# Patient Record
Sex: FEMALE | Race: ASIAN | Employment: UNEMPLOYED | URBAN - METROPOLITAN AREA
[De-identification: names, ages, dates, MRNs, and addresses within clinical notes are randomized per-mention and may not be internally consistent; named-entity substitution may affect disease eponyms.]

---

## 2019-03-15 PROCEDURE — 88175 CYTOPATH C/V AUTO FLUID REDO: CPT | Performed by: OBSTETRICS & GYNECOLOGY

## 2019-03-15 PROCEDURE — 87624 HPV HI-RISK TYP POOLED RSLT: CPT | Performed by: OBSTETRICS & GYNECOLOGY

## 2022-02-04 ENCOUNTER — OFFICE VISIT (OUTPATIENT)
Dept: SURGERY | Facility: CLINIC | Age: 47
End: 2022-02-04
Payer: COMMERCIAL

## 2022-02-04 VITALS — HEIGHT: 60 IN | BODY MASS INDEX: 23.75 KG/M2 | WEIGHT: 121 LBS | TEMPERATURE: 97 F

## 2022-02-04 DIAGNOSIS — Q83.1 AXILLARY ACCESSORY BREAST TISSUE: Primary | ICD-10-CM

## 2022-02-04 PROCEDURE — 99244 OFF/OP CNSLTJ NEW/EST MOD 40: CPT | Performed by: SURGERY

## 2022-02-04 PROCEDURE — 3008F BODY MASS INDEX DOCD: CPT | Performed by: SURGERY

## 2022-03-01 ENCOUNTER — OFFICE VISIT (OUTPATIENT)
Dept: SURGERY | Facility: CLINIC | Age: 47
End: 2022-03-01
Payer: COMMERCIAL

## 2022-03-01 VITALS
HEART RATE: 85 BPM | SYSTOLIC BLOOD PRESSURE: 117 MMHG | BODY MASS INDEX: 23.22 KG/M2 | HEIGHT: 61 IN | OXYGEN SATURATION: 98 % | DIASTOLIC BLOOD PRESSURE: 74 MMHG | RESPIRATION RATE: 16 BRPM | WEIGHT: 123 LBS

## 2022-03-01 DIAGNOSIS — M79.89 AXILLARY SWELLING: Primary | ICD-10-CM

## 2022-03-01 PROCEDURE — 3074F SYST BP LT 130 MM HG: CPT | Performed by: SURGERY

## 2022-03-01 PROCEDURE — 3078F DIAST BP <80 MM HG: CPT | Performed by: SURGERY

## 2022-03-01 PROCEDURE — 99242 OFF/OP CONSLTJ NEW/EST SF 20: CPT | Performed by: SURGERY

## 2022-03-01 PROCEDURE — 3008F BODY MASS INDEX DOCD: CPT | Performed by: SURGERY

## 2022-04-05 ENCOUNTER — OFFICE VISIT (OUTPATIENT)
Dept: SURGERY | Facility: CLINIC | Age: 47
End: 2022-04-05
Payer: COMMERCIAL

## 2022-04-05 DIAGNOSIS — Q83.1 ACCESSORY BREAST TISSUE OF AXILLA: Primary | ICD-10-CM

## 2022-04-05 PROCEDURE — 99212 OFFICE O/P EST SF 10 MIN: CPT | Performed by: SURGERY

## 2022-04-14 ENCOUNTER — TELEPHONE (OUTPATIENT)
Dept: SURGERY | Facility: CLINIC | Age: 47
End: 2022-04-14

## 2022-04-14 NOTE — TELEPHONE ENCOUNTER
Patient was called and offered surgery Date 7/27/2022 @ THE [facility]. Patient excepted. Pt reminded to complete medical clearance. Pt verbalized understanding.

## 2022-05-24 ENCOUNTER — TELEPHONE (OUTPATIENT)
Dept: SURGERY | Facility: CLINIC | Age: 47
End: 2022-05-24

## 2022-05-24 DIAGNOSIS — Q83.1 ACCESSORY BREAST TISSUE OF AXILLA: Primary | ICD-10-CM

## 2022-05-24 NOTE — TELEPHONE ENCOUNTER
Called patient and let her know we needed to change surgery to 08/10/2022 at [facility] with Dr. Paul Maier

## 2022-07-21 ENCOUNTER — TELEPHONE (OUTPATIENT)
Dept: SURGERY | Facility: CLINIC | Age: 47
End: 2022-07-21

## 2022-08-01 ENCOUNTER — TELEPHONE (OUTPATIENT)
Dept: SURGERY | Facility: CLINIC | Age: 47
End: 2022-08-01

## 2022-08-04 ENCOUNTER — TELEPHONE (OUTPATIENT)
Dept: SURGERY | Facility: CLINIC | Age: 47
End: 2022-08-04

## 2022-08-09 ENCOUNTER — TELEPHONE (OUTPATIENT)
Dept: SURGERY | Facility: CLINIC | Age: 47
End: 2022-08-09

## 2022-08-10 ENCOUNTER — HOSPITAL ENCOUNTER (OUTPATIENT)
Facility: HOSPITAL | Age: 47
Setting detail: HOSPITAL OUTPATIENT SURGERY
Discharge: HOME | End: 2022-08-10
Attending: SURGERY | Admitting: SURGERY
Payer: COMMERCIAL

## 2022-08-10 ENCOUNTER — ANESTHESIA EVENT (OUTPATIENT)
Dept: SURGERY | Facility: HOSPITAL | Age: 47
End: 2022-08-10
Payer: COMMERCIAL

## 2022-08-10 ENCOUNTER — ANESTHESIA (OUTPATIENT)
Dept: SURGERY | Facility: HOSPITAL | Age: 47
End: 2022-08-10
Payer: COMMERCIAL

## 2022-08-10 VITALS
BODY MASS INDEX: 22.66 KG/M2 | SYSTOLIC BLOOD PRESSURE: 114 MMHG | OXYGEN SATURATION: 100 % | RESPIRATION RATE: 14 BRPM | HEART RATE: 83 BPM | DIASTOLIC BLOOD PRESSURE: 64 MMHG | TEMPERATURE: 97 F | HEIGHT: 61 IN | WEIGHT: 120 LBS

## 2022-08-10 DIAGNOSIS — Q83.1 ACCESSORY BREAST TISSUE OF AXILLA: ICD-10-CM

## 2022-08-10 DIAGNOSIS — Z20.822 ENCOUNTER FOR PREPROCEDURE SCREENING LABORATORY TESTING FOR COVID-19: Primary | ICD-10-CM

## 2022-08-10 DIAGNOSIS — Z01.812 ENCOUNTER FOR PREPROCEDURE SCREENING LABORATORY TESTING FOR COVID-19: Primary | ICD-10-CM

## 2022-08-10 DIAGNOSIS — M79.89 AXILLARY SWELLING: ICD-10-CM

## 2022-08-10 LAB
B-HCG UR QL: NEGATIVE
B-HCG UR QL: NEGATIVE

## 2022-08-10 PROCEDURE — 88305 TISSUE EXAM BY PATHOLOGIST: CPT | Performed by: SURGERY

## 2022-08-10 PROCEDURE — 81025 URINE PREGNANCY TEST: CPT

## 2022-08-10 PROCEDURE — 0HB5XZZ EXCISION OF CHEST SKIN, EXTERNAL APPROACH: ICD-10-PCS | Performed by: SURGERY

## 2022-08-10 RX ORDER — MIDAZOLAM HYDROCHLORIDE 1 MG/ML
INJECTION INTRAMUSCULAR; INTRAVENOUS AS NEEDED
Status: DISCONTINUED | OUTPATIENT
Start: 2022-08-10 | End: 2022-08-10 | Stop reason: SURG

## 2022-08-10 RX ORDER — HYDROMORPHONE HYDROCHLORIDE 1 MG/ML
0.6 INJECTION, SOLUTION INTRAMUSCULAR; INTRAVENOUS; SUBCUTANEOUS EVERY 5 MIN PRN
Status: DISCONTINUED | OUTPATIENT
Start: 2022-08-10 | End: 2022-08-10

## 2022-08-10 RX ORDER — CEFAZOLIN SODIUM/WATER 2 G/20 ML
2 SYRINGE (ML) INTRAVENOUS ONCE
Status: COMPLETED | OUTPATIENT
Start: 2022-08-10 | End: 2022-08-10

## 2022-08-10 RX ORDER — HYDROMORPHONE HYDROCHLORIDE 1 MG/ML
0.4 INJECTION, SOLUTION INTRAMUSCULAR; INTRAVENOUS; SUBCUTANEOUS EVERY 5 MIN PRN
Status: DISCONTINUED | OUTPATIENT
Start: 2022-08-10 | End: 2022-08-10

## 2022-08-10 RX ORDER — HYDROMORPHONE HYDROCHLORIDE 1 MG/ML
0.2 INJECTION, SOLUTION INTRAMUSCULAR; INTRAVENOUS; SUBCUTANEOUS EVERY 5 MIN PRN
Status: DISCONTINUED | OUTPATIENT
Start: 2022-08-10 | End: 2022-08-10

## 2022-08-10 RX ORDER — LIDOCAINE HYDROCHLORIDE 10 MG/ML
INJECTION, SOLUTION EPIDURAL; INFILTRATION; INTRACAUDAL; PERINEURAL AS NEEDED
Status: DISCONTINUED | OUTPATIENT
Start: 2022-08-10 | End: 2022-08-10 | Stop reason: SURG

## 2022-08-10 RX ORDER — ONDANSETRON 4 MG/1
4 TABLET, FILM COATED ORAL EVERY 8 HOURS PRN
Qty: 12 TABLET | Refills: 0 | Status: SHIPPED | OUTPATIENT
Start: 2022-08-10

## 2022-08-10 RX ORDER — NALOXONE HYDROCHLORIDE 0.4 MG/ML
80 INJECTION, SOLUTION INTRAMUSCULAR; INTRAVENOUS; SUBCUTANEOUS AS NEEDED
Status: DISCONTINUED | OUTPATIENT
Start: 2022-08-10 | End: 2022-08-10

## 2022-08-10 RX ORDER — SODIUM CHLORIDE, SODIUM LACTATE, POTASSIUM CHLORIDE, CALCIUM CHLORIDE 600; 310; 30; 20 MG/100ML; MG/100ML; MG/100ML; MG/100ML
INJECTION, SOLUTION INTRAVENOUS CONTINUOUS
Status: DISCONTINUED | OUTPATIENT
Start: 2022-08-10 | End: 2022-08-10

## 2022-08-10 RX ORDER — HYDROCODONE BITARTRATE AND ACETAMINOPHEN 5; 325 MG/1; MG/1
2 TABLET ORAL ONCE AS NEEDED
Status: DISCONTINUED | OUTPATIENT
Start: 2022-08-10 | End: 2022-08-10

## 2022-08-10 RX ORDER — PROCHLORPERAZINE EDISYLATE 5 MG/ML
5 INJECTION INTRAMUSCULAR; INTRAVENOUS EVERY 8 HOURS PRN
Status: DISCONTINUED | OUTPATIENT
Start: 2022-08-10 | End: 2022-08-10

## 2022-08-10 RX ORDER — HYDROMORPHONE HYDROCHLORIDE 1 MG/ML
INJECTION, SOLUTION INTRAMUSCULAR; INTRAVENOUS; SUBCUTANEOUS
Status: COMPLETED
Start: 2022-08-10 | End: 2022-08-10

## 2022-08-10 RX ORDER — ONDANSETRON 2 MG/ML
4 INJECTION INTRAMUSCULAR; INTRAVENOUS EVERY 6 HOURS PRN
Status: DISCONTINUED | OUTPATIENT
Start: 2022-08-10 | End: 2022-08-10

## 2022-08-10 RX ORDER — ACETAMINOPHEN 500 MG
1000 TABLET ORAL ONCE
Status: DISCONTINUED | OUTPATIENT
Start: 2022-08-10 | End: 2022-08-10 | Stop reason: HOSPADM

## 2022-08-10 RX ORDER — LIDOCAINE HYDROCHLORIDE AND EPINEPHRINE 10; 10 MG/ML; UG/ML
INJECTION, SOLUTION INFILTRATION; PERINEURAL AS NEEDED
Status: DISCONTINUED | OUTPATIENT
Start: 2022-08-10 | End: 2022-08-10

## 2022-08-10 RX ORDER — SCOLOPAMINE TRANSDERMAL SYSTEM 1 MG/1
1 PATCH, EXTENDED RELEASE TRANSDERMAL ONCE
Status: DISCONTINUED | OUTPATIENT
Start: 2022-08-10 | End: 2022-08-10 | Stop reason: HOSPADM

## 2022-08-10 RX ORDER — DOCUSATE SODIUM 100 MG/1
100 CAPSULE, LIQUID FILLED ORAL 2 TIMES DAILY
Qty: 30 CAPSULE | Refills: 1 | Status: SHIPPED | OUTPATIENT
Start: 2022-08-10

## 2022-08-10 RX ORDER — HYDROCODONE BITARTRATE AND ACETAMINOPHEN 5; 325 MG/1; MG/1
1 TABLET ORAL ONCE AS NEEDED
Status: DISCONTINUED | OUTPATIENT
Start: 2022-08-10 | End: 2022-08-10

## 2022-08-10 RX ORDER — KETOROLAC TROMETHAMINE 30 MG/ML
INJECTION, SOLUTION INTRAMUSCULAR; INTRAVENOUS AS NEEDED
Status: DISCONTINUED | OUTPATIENT
Start: 2022-08-10 | End: 2022-08-10 | Stop reason: SURG

## 2022-08-10 RX ORDER — DEXAMETHASONE SODIUM PHOSPHATE 4 MG/ML
VIAL (ML) INJECTION AS NEEDED
Status: DISCONTINUED | OUTPATIENT
Start: 2022-08-10 | End: 2022-08-10 | Stop reason: SURG

## 2022-08-10 RX ORDER — PHENYLEPHRINE HCL 10 MG/ML
VIAL (ML) INJECTION AS NEEDED
Status: DISCONTINUED | OUTPATIENT
Start: 2022-08-10 | End: 2022-08-10 | Stop reason: SURG

## 2022-08-10 RX ORDER — ACETAMINOPHEN 500 MG
1000 TABLET ORAL ONCE AS NEEDED
Status: DISCONTINUED | OUTPATIENT
Start: 2022-08-10 | End: 2022-08-10

## 2022-08-10 RX ORDER — HYDROCODONE BITARTRATE AND ACETAMINOPHEN 5; 325 MG/1; MG/1
1-2 TABLET ORAL EVERY 4 HOURS PRN
Qty: 20 TABLET | Refills: 0 | Status: SHIPPED | OUTPATIENT
Start: 2022-08-10

## 2022-08-10 RX ORDER — ONDANSETRON 2 MG/ML
INJECTION INTRAMUSCULAR; INTRAVENOUS AS NEEDED
Status: DISCONTINUED | OUTPATIENT
Start: 2022-08-10 | End: 2022-08-10 | Stop reason: SURG

## 2022-08-10 RX ADMIN — SODIUM CHLORIDE, SODIUM LACTATE, POTASSIUM CHLORIDE, CALCIUM CHLORIDE: 600; 310; 30; 20 INJECTION, SOLUTION INTRAVENOUS at 14:36:00

## 2022-08-10 RX ADMIN — KETOROLAC TROMETHAMINE 30 MG: 30 INJECTION, SOLUTION INTRAMUSCULAR; INTRAVENOUS at 15:22:00

## 2022-08-10 RX ADMIN — PHENYLEPHRINE HCL 100 MCG: 10 MG/ML VIAL (ML) INJECTION at 15:04:00

## 2022-08-10 RX ADMIN — MIDAZOLAM HYDROCHLORIDE 2 MG: 1 INJECTION INTRAMUSCULAR; INTRAVENOUS at 14:35:00

## 2022-08-10 RX ADMIN — DEXAMETHASONE SODIUM PHOSPHATE 8 MG: 4 MG/ML VIAL (ML) INJECTION at 14:45:00

## 2022-08-10 RX ADMIN — PHENYLEPHRINE HCL 100 MCG: 10 MG/ML VIAL (ML) INJECTION at 15:10:00

## 2022-08-10 RX ADMIN — PHENYLEPHRINE HCL 100 MCG: 10 MG/ML VIAL (ML) INJECTION at 15:21:00

## 2022-08-10 RX ADMIN — ONDANSETRON 4 MG: 2 INJECTION INTRAMUSCULAR; INTRAVENOUS at 15:22:00

## 2022-08-10 RX ADMIN — CEFAZOLIN SODIUM/WATER 2 G: 2 G/20 ML SYRINGE (ML) INTRAVENOUS at 14:43:00

## 2022-08-10 RX ADMIN — LIDOCAINE HYDROCHLORIDE 50 MG: 10 INJECTION, SOLUTION EPIDURAL; INFILTRATION; INTRACAUDAL; PERINEURAL at 14:40:00

## 2022-08-11 NOTE — OPERATIVE REPORT
[facility]    PATIENT'S NAME: Kelsie Gonzalez   ATTENDING PHYSICIAN: Paulino Baptiste M.D. OPERATING PHYSICIAN: Paulino Baptiste M.D. PATIENT ACCOUNT#:   [de-identified]    LOCATION:  [place] [address]  MEDICAL RECORD #:   [id number]       YOB: 1975  ADMISSION DATE:       08/10/2022      OPERATION DATE:  08/10/2022    OPERATIVE REPORT    PREOPERATIVE DIAGNOSIS:  Bilateral axillary breast tissue. POSTOPERATIVE DIAGNOSIS:  Bilateral axillary breast tissue. PROCEDURE:  Excision of bilateral axillary breast tissue and complex closure totaling 12.5 cm. ASSISTANT:  CLAUDINE Acosta. ANESTHESIA:  General.    ESTIMATED BLOOD LOSS:  10 mL. COMPLICATIONS:  None. INDICATIONS:  The patient is a 56-year-old female referred for evaluation of preop axillary masses. On examination, she was noted to have findings consistent with ectopic axillary breast tissue in the right breast greater than left. We discussed surgical excision under anesthesia. The risks, benefits, and alternatives were reviewed with the patient preoperatively. She expressed understanding and wished to proceed. OPERATIVE TECHNIQUE:  The patient was marked in preoperative holding area in the upright position. The areas of skin and soft tissue excess were marked in ellipses. These were placed within the axillary skin creases. The patient was then taken to the operating room, properly identified, placed in the supine position. Sequential compression devices were placed on bilateral lower extremities. Intravenous antibiotic prophylaxis was administered. The patient then underwent successful induction of general anesthesia and endotracheal intubation. The arms were placed abducted on foam-padded arm boards and loosely secured with Kerlix. The chest and axillary regions were prepped and draped sterilely. The proposed excisions were infiltrated with 1% lidocaine with epinephrine.   The procedure began on the left breast.  The skin was incised with scalpel. The subcutaneous soft tissue excess was then excised with electrocautery. The wounds were irrigated with saline irrigation. Hemostasis was secured with electrocautery. Deep tissues were reapproximated with interrupted 3-0 Vicryl sutures. The deep dermis was reapproximated with interrupted 2-0 Vicryl deep dermal suture, and a running 4-0 Monocryl subcuticular suture. Attention was then turned to the right breast.  In a similar fashion, the ellipse was incised with a scalpel. The subcutaneous tissues were divided with electrocautery and the soft tissue excess was excised. The wound was then irrigated with saline irrigation. Hemostasis was secured with electrocautery. The deep tissues were reapproximated with interrupted 3-0 Vicryl deep sutures, deep dermis reapproximated with interrupted 2-0 Vicryl deep dermal suture and a running 4-0 Monocryl subcuticular suture. Exofin and Steri-Strips were placed on the incisions. TopiFoam and a bra were placed. The patient was awakened, extubated, taken to the recovery area in stable condition. There were no intraoperative complications. All needle, sponge, and instrument counts were correct at the end of the procedure. The length of the closure totaled 12.5 cm. The tissue was sent for permanent pathologic analysis as right and left axillary breast tissue. Dictated By Andrez Rear Marlee Galeazzi, M.D.  d: 08/10/2022 16:00:54  t: 08/11/2022 02:13:23  [facility] [id number]  [facility]/

## 2022-08-22 ENCOUNTER — OFFICE VISIT (OUTPATIENT)
Dept: SURGERY | Facility: CLINIC | Age: 47
End: 2022-08-22
Payer: COMMERCIAL

## 2022-08-22 DIAGNOSIS — Q83.1 AXILLARY ACCESSORY BREAST TISSUE: Primary | ICD-10-CM

## 2022-08-22 NOTE — PROGRESS NOTES
Fan Reed is a 56year old female who presents today for a follow-up after excision of bilateral axillary breast tissue on 8/10/202 with Dr. Jeison Bland. She denies fever and chills. She denies nausea, vomiting, diarrhea or constipation. Her pain is controlled. She is not taking narcotic pain medication. Physical Exam     Bilateral axillary incisions clean, dry and intact. No wound drainage or wound dehiscence. No erythema. No evidence of hematoma bilaterally. There is mild soft tissue swelling on the right. There were no vitals filed for this visit. Assessment and Plan     Fan Reed is doing well s/p excision of bilateral axillary breast tissue on 8/10/202 with Dr. Sparkle Jose were placed over both incisions today. I recommend she start compressing both surgical areas and she was given some supplies to do this today. I recommend she continue with activity restrictions. We reviewed reasons to contact our office. She will follow-up with Dr. Jeison Bland in 2 weeks. Questions were answered. Patient understands.      Dain Concepcion  8/22/2022  10:14 AM

## 2022-09-06 ENCOUNTER — OFFICE VISIT (OUTPATIENT)
Dept: SURGERY | Facility: CLINIC | Age: 47
End: 2022-09-06
Payer: COMMERCIAL

## 2022-09-06 DIAGNOSIS — Q83.1 AXILLARY ACCESSORY BREAST TISSUE: Primary | ICD-10-CM

## 2022-09-06 PROCEDURE — 99024 POSTOP FOLLOW-UP VISIT: CPT | Performed by: SURGERY

## 2022-09-06 NOTE — PROGRESS NOTES
Yang Allison is a 56year old female who presents today for a follow-up. He is without new complaints. She reports tightness in her right axillary region and arm    Physical Examination:  Breasts: Bilateral axillary incisions are well-healed with a palpable healing ridge. There is no erythema or seroma noted. Assessment and Plan:  Patient is doing well. Given her right axillary tightness, I recommended physical therapy for range of motion exercises. We discussed scar care including massage moisturizer and silicone products. Pathology results were reviewed. The patient will follow-up in 3 to 6 months for scar check. The plan was reviewed with the patient and questions were answered.

## 2022-09-14 ENCOUNTER — TELEPHONE (OUTPATIENT)
Dept: PHYSICAL THERAPY | Facility: HOSPITAL | Age: 47
End: 2022-09-14

## 2022-09-15 ENCOUNTER — OFFICE VISIT (OUTPATIENT)
Dept: PHYSICAL THERAPY | Age: 47
End: 2022-09-15
Attending: SURGERY
Payer: COMMERCIAL

## 2022-09-15 DIAGNOSIS — Q83.1 AXILLARY ACCESSORY BREAST TISSUE: ICD-10-CM

## 2022-09-15 PROCEDURE — 97140 MANUAL THERAPY 1/> REGIONS: CPT

## 2022-09-15 PROCEDURE — 97162 PT EVAL MOD COMPLEX 30 MIN: CPT

## 2022-09-15 PROCEDURE — 97110 THERAPEUTIC EXERCISES: CPT

## 2022-09-20 ENCOUNTER — OFFICE VISIT (OUTPATIENT)
Dept: PHYSICAL THERAPY | Age: 47
End: 2022-09-20
Attending: SURGERY

## 2022-09-20 PROCEDURE — 97112 NEUROMUSCULAR REEDUCATION: CPT

## 2022-09-20 PROCEDURE — 97110 THERAPEUTIC EXERCISES: CPT

## 2022-09-20 PROCEDURE — 97140 MANUAL THERAPY 1/> REGIONS: CPT

## 2022-09-22 ENCOUNTER — OFFICE VISIT (OUTPATIENT)
Dept: PHYSICAL THERAPY | Age: 47
End: 2022-09-22
Attending: SURGERY

## 2022-09-22 PROCEDURE — 97140 MANUAL THERAPY 1/> REGIONS: CPT

## 2022-09-22 PROCEDURE — 97112 NEUROMUSCULAR REEDUCATION: CPT

## 2022-09-22 PROCEDURE — 97110 THERAPEUTIC EXERCISES: CPT

## 2022-09-27 ENCOUNTER — OFFICE VISIT (OUTPATIENT)
Dept: PHYSICAL THERAPY | Age: 47
End: 2022-09-27
Attending: SURGERY

## 2022-09-27 PROCEDURE — 97112 NEUROMUSCULAR REEDUCATION: CPT

## 2022-09-27 PROCEDURE — 97110 THERAPEUTIC EXERCISES: CPT

## 2022-09-27 PROCEDURE — 97140 MANUAL THERAPY 1/> REGIONS: CPT

## 2022-09-29 ENCOUNTER — OFFICE VISIT (OUTPATIENT)
Dept: PHYSICAL THERAPY | Age: 47
End: 2022-09-29
Attending: SURGERY

## 2022-09-29 PROCEDURE — 97140 MANUAL THERAPY 1/> REGIONS: CPT

## 2022-09-29 PROCEDURE — 97110 THERAPEUTIC EXERCISES: CPT

## 2022-09-29 PROCEDURE — 97112 NEUROMUSCULAR REEDUCATION: CPT

## 2022-10-04 ENCOUNTER — OFFICE VISIT (OUTPATIENT)
Dept: PHYSICAL THERAPY | Age: 47
End: 2022-10-04
Attending: SURGERY
Payer: COMMERCIAL

## 2022-10-04 PROCEDURE — 97110 THERAPEUTIC EXERCISES: CPT

## 2022-10-04 PROCEDURE — 97112 NEUROMUSCULAR REEDUCATION: CPT

## 2022-10-04 PROCEDURE — 97140 MANUAL THERAPY 1/> REGIONS: CPT

## 2022-10-06 ENCOUNTER — OFFICE VISIT (OUTPATIENT)
Dept: PHYSICAL THERAPY | Age: 47
End: 2022-10-06
Attending: SURGERY
Payer: COMMERCIAL

## 2022-10-06 PROCEDURE — 97140 MANUAL THERAPY 1/> REGIONS: CPT

## 2022-10-06 PROCEDURE — 97110 THERAPEUTIC EXERCISES: CPT

## 2022-10-11 ENCOUNTER — OFFICE VISIT (OUTPATIENT)
Dept: PHYSICAL THERAPY | Age: 47
End: 2022-10-11
Attending: SURGERY
Payer: COMMERCIAL

## 2022-10-11 PROCEDURE — 97110 THERAPEUTIC EXERCISES: CPT

## 2022-10-11 PROCEDURE — 97140 MANUAL THERAPY 1/> REGIONS: CPT

## 2024-08-23 ENCOUNTER — TELEPHONE (OUTPATIENT)
Dept: PHYSICAL THERAPY | Facility: HOSPITAL | Age: 49
End: 2024-08-23

## 2024-08-29 ENCOUNTER — ORDER TRANSCRIPTION (OUTPATIENT)
Dept: PHYSICAL THERAPY | Facility: HOSPITAL | Age: 49
End: 2024-08-29

## 2024-08-29 DIAGNOSIS — M54.2 CERVICAL PAIN: Primary | ICD-10-CM

## 2024-08-30 ENCOUNTER — TELEPHONE (OUTPATIENT)
Dept: PHYSICAL THERAPY | Facility: HOSPITAL | Age: 49
End: 2024-08-30

## 2024-09-03 ENCOUNTER — OFFICE VISIT (OUTPATIENT)
Dept: PHYSICAL THERAPY | Age: 49
End: 2024-09-03
Attending: SPECIALIST
Payer: COMMERCIAL

## 2024-09-03 DIAGNOSIS — M54.2 CERVICAL PAIN: Primary | ICD-10-CM

## 2024-09-03 PROCEDURE — 97161 PT EVAL LOW COMPLEX 20 MIN: CPT | Performed by: PHYSICAL THERAPIST

## 2024-09-03 PROCEDURE — 97110 THERAPEUTIC EXERCISES: CPT | Performed by: PHYSICAL THERAPIST

## 2024-09-03 NOTE — PROGRESS NOTES
PHYSICAL THERAPY EVALUATION:    Referring Physician: Parvez Monae    DX Code: Cervical pain (M54.2)      PT DX: Cervical pain (M54.2)     PCP:     Age: 49 Occupation: -    DOI: 3 weeks  DOS: -         SUBJECTIVE:   HX of Symptoms: history of episodic neck pain for bout 5 years, resolves, then returns after a few days. Am's better, pm's worse. When pain is bad, she gets soreness in forearms as well, but usually nothing in upper arms.  Relevant PMH: endometriosis, axillary accessory tissue removed  Precautions:  None  Imaging/Tests: none      Pain Summary:  5-9/10     Functional Limitations:   Household Maintainence:   Current: vacuum, cook ~ 15 min      Prior: not limited  Disturbed Sleep: No  Sleeping Posture:Supine, Sidelying   Leisure Activities: 3 children, one in college, twins in high school  Work Activities: none  Patient Goals: control pain    Symptoms aggravated by:    Symptoms relieved by:   On the move At rest      OBJECTIVE:      Postural Summary:  forward head, cervical spine is flexed lower, extended upper with notable Dowager's hump.        ROM Summary: RR: 60, LR: 60 ext 40 nil,  flex 30 P, NW neck,  RSB 20 w ERP R&L, LSB 20 w ERP R only        Special Tests Summary: neg Spurlings, neg compression        Flexibility Summary: tight pecs bilaterally          Strength Summary: grossly 5/5 in shoulders/arms. Deep neck flexors 4/5        Sensation:  intact to light touch   Dural Signs: neg     Palpation Summary: tender along cervical p/s mm    Additional Information / Findings:  pt responds to cervical ret/ext which decreases pain with cervical flexion. She does feel some tightness in head after, pt instructed to not perform supine ret/ext until head pressure has subsided, then stop if it recurs.  Today’s Treatment:  PA's to cspine x 5 min, seated Crot, Cret/ext, prone trunk and shoulder ext, supine man c ret/ext, self c ret, ext x 12 min  HEP: Handouts given  Pt. Education: Patient counseled to  maintain / resume normal activities.  Postural Education     Signs and symptoms are consistent with patient’s diagnosis.  Functional impairments include:Difficulty or limited tolerance for ADLs including: cooking and cleaning and Unable to participate in normal exercise and / or sports activities    Rehab Potential:good  Education or treatment limitation: None    Assessment: Pt presents to physical therapy evaluation with primary c/o neck and UT/shoulder pain. The results of the objective tests and measures show limited CROM with ERP.  Functional deficits include but are not limited to cooking and cleaning.  Signs and symptoms are consistent with diagnosis of Cervical pain (M54.2) . Pt and PT discussed evaluation findings, pathology, POC and HEP.  Pt voiced understanding and performs HEP correctly without reported pain. Skilled Physical Therapy is medically necessary to address the above impairments and reach functional goals. Pt with apparent tension pain due to sustained cervical flexion during the day.    NDI 28%    Plan of Care:  Treatment will focus on the following goals:       Long term goals to be reached in 8 visits.  Pt. will report decreased pain from 9/10 to 5/10 at worst.  Increase pain free range of motion of cervical rotation to 70 degrees.  Pt to demonstrate fair postural awareness, avoidance of looking down at phone  Pt. will be able to tolerate housework for 30 minutes without increased symptoms.  Pt to be able to abolish pain at onset with HEP/posture  Pt. will be independent with home exercise program and self management.    Patient will be seen 2 x /week for 4 weeks or a total of 8 visits.   Treatment will include:   Manual therapy to address joint and/or soft tissue mobility  Therapeutic exercises  Pt. education for posture, body mechanics, and ergonomics  HEP instruction                                                             Pt. was advised regarding the findings of this evaluation and  agrees to the plan of care.   Therapeutic Exercise (15 min ea)  87219: 13 minutes  Physical Therapy Eval.  [id number]    In agreement with evaluation findings and clinical rationale, this evaluation involved Low Complexity decision making due to 1-2 personal factors/comorbidities, 3 body structures involved/activity limitations, and evolving symptoms including changing pain levels.  Are you being hurt, frightened, demeaned, or taken advantage of by anyone at your home or in your life?  No        Have you recently had thoughts of hurting yourself?  No    Have you tried to hurt yourself in the past?  No

## 2024-09-05 ENCOUNTER — OFFICE VISIT (OUTPATIENT)
Dept: PHYSICAL THERAPY | Age: 49
End: 2024-09-05
Attending: SPECIALIST
Payer: COMMERCIAL

## 2024-09-05 PROCEDURE — 97110 THERAPEUTIC EXERCISES: CPT | Performed by: PHYSICAL THERAPIST

## 2024-09-05 PROCEDURE — 97140 MANUAL THERAPY 1/> REGIONS: CPT | Performed by: PHYSICAL THERAPIST

## 2024-09-05 NOTE — PROGRESS NOTES
Dx: Cervical pain (M54.2)         Authorized # of Visits:  8         Next MD visit: none scheduled  Fall Risk: standard         Precautions: n/a           Medication Changes since last visit?: No  Subjective: doing HEP, supine C ext causes momentary light headedness. Otherwise all ex feel fine. Symptoms largely unchanged.  Pain nil in neutral, 4/10 ERP with RR, LR, RSB, LSB, nil with ext, tight with flexion.    Objective: Reviewed HEP. Rx per grid.      Date: 9/5/2024  Tx#: 2/8 Date:   Tx#: 3/ Date:   Tx#: 4/ Date:   Tx#: 5/ Date:   Tx#: 6/ Date:   Tx#: 7/ Date:   Tx#: 8/   UBE 6 min, L5 alt         Bicep curl/mil press 4# 2x10         Shoulder abd and flex to 90 with 2# 2x10 ea         Supine dumbbells3# press, circles CW/CCW and abd x 20 ea         Supine man tx x 3 min         Supine STM to cervical mm x 3 min         Supine rot mobs, UT stretch x 2 min ea         Prone trunk ext 2x10         Prone man thor ext mobs gr 3 x 3 min         Seated LR mobs gr 3 x 1 min             Assessment: min irritability of symptoms, asymptomatic except ERP with rot and SB bilaterally, responds to man techniques. Pt is mildly deconditioned, fatigues rapidly with resistance ex.      Goals:   to be reached in 8 visits.  Pt. will report decreased pain from 9/10 to 5/10 at worst.  Increase pain free range of motion of cervical rotation to 70 degrees.  Pt to demonstrate fair postural awareness, avoidance of looking down at phone  Pt. will be able to tolerate housework for 30 minutes without increased symptoms.  Pt to be able to abolish pain at onset with HEP/posture  Pt. will be independent with home exercise program and self management.    Plan: cont current HEP, stop supine C ext if head symptoms persist or worsen, progress neck and trunk strength for improved postural control. Pt to add self towel rot mobs to HEP.    Skilled Services: TE, MT    Charges: TE2, MT1      Total Timed Treatment:  MT 13 min, TE31  min  Total Treatment  Time: 43 min

## 2024-09-10 ENCOUNTER — OFFICE VISIT (OUTPATIENT)
Dept: PHYSICAL THERAPY | Age: 49
End: 2024-09-10
Attending: SPECIALIST
Payer: COMMERCIAL

## 2024-09-10 PROCEDURE — 97140 MANUAL THERAPY 1/> REGIONS: CPT | Performed by: PHYSICAL THERAPIST

## 2024-09-10 PROCEDURE — 97110 THERAPEUTIC EXERCISES: CPT | Performed by: PHYSICAL THERAPIST

## 2024-09-10 NOTE — PROGRESS NOTES
Dx: Cervical pain (M54.2)         Authorized # of Visits:  8         Next MD visit: none scheduled  Fall Risk: standard         Precautions: n/a           Medication Changes since last visit?: No  Subjective: pt states she has done HEP only once since last visit \"busy\", no change in symptoms.  Pain nil in neutral, 4/10 ERP with RR, LR, RSB, LSB, nil with ext, tight with flexion.    Objective:  I explained to pt that unless she does HEP we will not know if her HEP will have an effect on her symptoms and she cannot expect to see any change in her symptoms.  Treatment as below. Symptoms decrease; pt has nil sx's with flexion post session, some ERP remains with RR and LR. Pt instructed to perform self rot with Op every 2 hours and assess response.      Date: 9/5/2024  Tx#: 2/8 Date: 9/10/24  Tx#: 3/8 Date:   Tx#: 4/ Date:   Tx#: 5/ Date:   Tx#: 6/ Date:   Tx#: 7/ Date:   Tx#: 8/   UBE 6 min, L5 alt UBE 6 min, L5 alt        Bicep curl/mil press 4# 2x10 Bicep curl/mil press 4# 3x10        Shoulder abd and flex to 90 with 2# 2x10 ea Shoulder abd and flex to 90 with 2# 2x10 ea        Supine dumbbells3# press, circles CW/CCW and abd x 20 ea Supine dumbbells3# press, circles CW/CCW and abd x 20 ea        Supine man tx x 3 min Supine man tx x 3 min        Supine STM to cervical mm x 3 min Supine STM to cervical mm x 3 min        Supine rot mobs, UT stretch x 2 min ea Supine rot mobs, UT stretch x 2 min ea        Prone trunk ext 2x10 Prone trunk ext 2x10        Prone man thor ext mobs gr 3 x 3 min Prone man thor ext mobs gr 3 x 3 min        Seated LR mobs gr 3 x 1 min Seated LR mobs gr 3 x 1 min            Assessment: min irritability of symptoms, asymptomatic except ERP with rot bilaterally post session, responds to man techniques. Pt not compliant with HEP, is mildly deconditioned, fatigues rapidly with resistance ex.      Goals:   to be reached in 8 visits.  Pt. will report decreased pain from 9/10 to 5/10 at  worst.  Increase pain free range of motion of cervical rotation to 70 degrees.  Pt to demonstrate fair postural awareness, avoidance of looking down at phone  Pt. will be able to tolerate housework for 30 minutes without increased symptoms.  Pt to be able to abolish pain at onset with HEP/posture  Pt. will be independent with home exercise program and self management.    Plan: cont current HEP, progress neck and trunk strength for improved postural control. Pt to add sself rot OP to HEP Q 2 hours, assess response.    Skilled Services: TE, MT    Charges: TE2, MT1      Total Timed Treatment:  MT 13 min, TE30  min  Total Treatment Time: 42 min

## 2024-09-12 ENCOUNTER — OFFICE VISIT (OUTPATIENT)
Dept: PHYSICAL THERAPY | Age: 49
End: 2024-09-12
Attending: SPECIALIST
Payer: COMMERCIAL

## 2024-09-12 PROCEDURE — 97110 THERAPEUTIC EXERCISES: CPT | Performed by: PHYSICAL THERAPIST

## 2024-09-12 PROCEDURE — 97012 MECHANICAL TRACTION THERAPY: CPT | Performed by: PHYSICAL THERAPIST

## 2024-09-12 PROCEDURE — 97140 MANUAL THERAPY 1/> REGIONS: CPT | Performed by: PHYSICAL THERAPIST

## 2024-09-12 NOTE — PROGRESS NOTES
Dx: Cervical pain (M54.2)         Authorized # of Visits:  8         Next MD visit: none scheduled  Fall Risk: standard         Precautions: n/a           Medication Changes since last visit?: No  Subjective: pt states she has done HEP only once since last visit \"busy\", no change in symptoms.  Pain nil in neutral, 4/10 ERP with RR, LR, RSB, LSB, nil with ext, tight with flexion.    Objective:  I explained to pt that unless she does HEP we will not know if her HEP will have an effect on her symptoms and she cannot expect to see any change in her symptoms.  [facility] Symptoms decrease; pt has nil sx's with flexion post session, min discomfort remains with RR and LR. Pt instructed to perform self rot with Op every 2 hours and assess response.      Date: 9/5/2024  Tx#: 2/8 Date: 9/10/24  Tx#: 3/8 Date: 9/12/24  Tx#: 4/8 Date:   Tx#: 5/ Date:   Tx#: 6/ Date:   Tx#: 7/ Date:   Tx#: 8/   UBE 6 min, L5 alt UBE 6 min, L5 alt UBE 6 min, L5 alt       Bicep curl/mil press 4# 2x10 Bicep curl/mil press 4# 3x10 -       Shoulder abd and flex to 90 with 2# 2x10 ea Shoulder abd and flex to 90 with 2# 2x10 ea -       Supine dumbbells3# press, circles CW/CCW and abd x 20 ea Supine dumbbells3# press, circles CW/CCW and abd x 20 ea -       Supine man tx x 3 min Supine man tx x 3 min Supine man tx x 3 min       Supine STM to cervical mm x 3 min Supine STM to cervical mm x 3 min Supine STM to cervical mm x 3 min       Supine rot mobs, UT stretch x 2 min ea Supine rot mobs, UT stretch x 2 min ea Supine rot mobs, UT stretch x 2 min ea       Prone trunk ext 2x10 Prone trunk ext 2x10 Prone trunk ext 2x10       Prone man thor ext mobs gr 3 x 3 min Prone man thor ext mobs gr 3 x 3 min Prone man thor ext mobs gr 3 x 3 min       Seated LR mobs gr 3 x 1 min Seated LR mobs gr 3 x 1 min Seated LR mobs gr 3 x 1 min         Mec tx 15 min, 30\"/20\", 19#/10#           Assessment: min irritability of symptoms, asymptomatic except ERP with  rot bilaterally post session, responds to man techniques and mech tx. Pt not compliant with HEP, is mildly deconditioned, fatigues rapidly with resistance ex.      Goals:   to be reached in 8 visits.  Pt. will report decreased pain from 9/10 to 5/10 at worst.  Increase pain free range of motion of cervical rotation to 70 degrees.  Pt to demonstrate fair postural awareness, avoidance of looking down at phone  Pt. will be able to tolerate housework for 30 minutes without increased symptoms.  Pt to be able to abolish pain at onset with HEP/posture  Pt. will be independent with home exercise program and self management.    Plan: cont current HEP, progress neck and trunk strength for improved postural control. Pt to add sself rot OP to HEP Q 2 hours, assess response.    Skilled Services: CRISTHIAN ADAIR    Charges: TE1, MT1, mech tx      Total Timed Treatment:  MT 13 min, TE16  min, mech tx x 15 min  Total Treatment Time: 44 min

## 2024-09-20 ENCOUNTER — OFFICE VISIT (OUTPATIENT)
Dept: PHYSICAL THERAPY | Age: 49
End: 2024-09-20
Attending: SPECIALIST
Payer: COMMERCIAL

## 2024-09-20 PROCEDURE — 97140 MANUAL THERAPY 1/> REGIONS: CPT | Performed by: PHYSICAL THERAPIST

## 2024-09-20 PROCEDURE — 97110 THERAPEUTIC EXERCISES: CPT | Performed by: PHYSICAL THERAPIST

## 2024-09-20 NOTE — PROGRESS NOTES
Dx: Cervical pain (M54.2)         Authorized # of Visits:  8         Next MD visit: none scheduled  Fall Risk: standard         Precautions: n/a           Medication Changes since last visit?: No  Subjective: pt states she has done HEP occasionally since last visit flex/ext is better, rot remains tight.  Pain nil in neutral, 4/10 ERP with RR, LR, nil with ext, flexion.    Objective:  pt instructed in self rot mobs with pillow case, progressed treatment as below, pt  demonstrates decreased symptoms post session, min discomfort remains with RR and LR. Pt instructed to perform self rot with towel/pillow case every 2 hours and assess response.      Date: 9/5/2024  Tx#: 2/8 Date: 9/10/24  Tx#: 3/8 Date: 9/12/24  Tx#: 4/8 Date: 9/20/24  Tx#: 5/8 Date:   Tx#: 6/ Date:   Tx#: 7/ Date:   Tx#: 8/   UBE 6 min, L5 alt UBE 6 min, L5 alt UBE 6 min, L5 alt UBE 6 min, L5 alt      Bicep curl/mil press 4# 2x10 Bicep curl/mil press 4# 3x10 - Bicep curl/mil press 4# 3x12      Shoulder abd and flex to 90 with 2# 2x10 ea Shoulder abd and flex to 90 with 2# 2x10 ea -       Supine dumbbells3# press, circles CW/CCW and abd x 20 ea Supine dumbbells3# press, circles CW/CCW and abd x 20 ea -       Supine man tx x 3 min Supine man tx x 3 min Supine man tx x 3 min Supine man tx x 3 min      Supine STM to cervical mm x 3 min Supine STM to cervical mm x 3 min Supine STM to cervical mm x 3 min Supine STM to cervical mm x 3 min      Supine rot mobs, UT stretch x 2 min ea Supine rot mobs, UT stretch x 2 min ea Supine rot mobs, UT stretch x 2 min ea Supine rot mobs, UT stretch x 2 min ea      Prone trunk ext 2x10 Prone trunk ext 2x10 Prone trunk ext 2x10 Prone trunk ext 2x10      Prone man thor ext mobs gr 3 x 3 min Prone man thor ext mobs gr 3 x 3 min Prone man thor ext mobs gr 3 x 3 min Prone man thor ext mobs gr 3 x 3 min      Seated LR mobs gr 3 x 1 min Seated LR mobs gr 3 x 1 min Seated LR mobs gr 3 x 1 min Seated LR mobs gr 3 x 1 min          Self towel rot mobs x 2 min ea        Mech tx 15 min, 30\"/20\", 19#/10# -          Assessment: min irritability of symptoms, asymptomatic except mild ERP with rot bilaterally post session, responds to man techniques and mech tx. Pt fatigues rapidly with resistance ex.      Goals:   to be reached in 8 visits.  Pt. will report decreased pain from 9/10 to 5/10 at worst.  Increase pain free range of motion of cervical rotation to 70 degrees.  Pt to demonstrate fair postural awareness, avoidance of looking down at phone  Pt. will be able to tolerate housework for 30 minutes without increased symptoms.  Pt to be able to abolish pain at onset with HEP/posture  Pt. will be independent with home exercise program and self management.    Plan: add self towel rot OP to HEP, progress neck and trunk strength for improved postural control.     Skilled Services: TE, MT    Charges: TE2, MT1     Total Timed Treatment:  MT 13 min, TE 27  min, Total Treatment Time: 40 min

## 2024-09-24 ENCOUNTER — OFFICE VISIT (OUTPATIENT)
Dept: PHYSICAL THERAPY | Age: 49
End: 2024-09-24
Attending: SPECIALIST
Payer: COMMERCIAL

## 2024-09-24 PROCEDURE — 97140 MANUAL THERAPY 1/> REGIONS: CPT | Performed by: PHYSICAL THERAPIST

## 2024-09-24 PROCEDURE — 97110 THERAPEUTIC EXERCISES: CPT | Performed by: PHYSICAL THERAPIST

## 2024-09-24 NOTE — PROGRESS NOTES
Dx: Cervical pain (M54.2)         Authorized # of Visits:  8         Next MD visit: none scheduled  Fall Risk: standard         Precautions: n/a           Medication Changes since last visit?: No  Subjective: pt states pillow case stretch is hlepful, feeling tight today, maybe because of change in weather.  Pain nil in neutral, 4/10 ERP with RR, LR, nil with ext, flexion.    Objective:  Reviewed self rot mobs with pillow case, progressed treatment as below including trial of hot pack to cervical spine x 10 min, supine, pt  demonstrates decreased symptoms post session, min discomfort remains with RR and LR. Pt instructed to cont self rot with towel/pillow case every 2 hours and assess response.      Date: 9/5/2024  Tx#: 2/8 Date: 9/10/24  Tx#: 3/8 Date: 9/12/24  Tx#: 4/8 Date: 9/20/24  Tx#: 5/8 Date: 9/24/24  Tx#: 6/8 Date:   Tx#: 7/ Date:   Tx#: 8/   UBE 6 min, L5 alt UBE 6 min, L5 alt UBE 6 min, L5 alt UBE 6 min, L5 alt UBE 6 min, L5 alt     Bicep curl/mil press 4# 2x10 Bicep curl/mil press 4# 3x10 - Bicep curl/mil press 4# 3x12 Bicep curl/mil press 4# 3x12     Shoulder abd and flex to 90 with 2# 2x10 ea Shoulder abd and flex to 90 with 2# 2x10 ea -  Shoulder abd and flex to 90 with 2# 2x10 ea     Supine dumbbells3# press, circles CW/CCW and abd x 20 ea Supine dumbbells3# press, circles CW/CCW and abd x 20 ea -  Supine dumbbells3# press, circles CW/CCW and abd x 30 ea     Supine man tx x 3 min Supine man tx x 3 min Supine man tx x 3 min Supine man tx x 3 min Supine man tx x 3 min     Supine STM to cervical mm x 3 min Supine STM to cervical mm x 3 min Supine STM to cervical mm x 3 min Supine STM to cervical mm x 3 min Supine STM to cervical mm x 3 min     Supine rot mobs, UT stretch x 2 min ea Supine rot mobs, UT stretch x 2 min ea Supine rot mobs, UT stretch x 2 min ea Supine rot mobs, UT stretch x 2 min ea Supine rot mobs, UT stretch x 2 min ea     Prone trunk ext 2x10 Prone trunk ext 2x10 Prone trunk ext 2x10  Prone trunk ext 2x10 Prone trunk ext 2x10     Prone man thor ext mobs gr 3 x 3 min Prone man thor ext mobs gr 3 x 3 min Prone man thor ext mobs gr 3 x 3 min Prone man thor ext mobs gr 3 x 3 min Prone man thor ext mobs gr 3 x 3 min     Seated LR mobs gr 3 x 1 min Seated LR mobs gr 3 x 1 min Seated LR mobs gr 3 x 1 min Seated LR mobs gr 3 x 1 min Seated LR mobs gr 3 x 1 min        Self towel rot mobs x 2 min ea reviewed       Mech tx 15 min, 30\"/20\", 19#/10# - Supine HP to neck x 10 min         Assessment: increased tightness today, asymptomatic except mild ERP with LR post session, responds to man techniques and mech tx. Pt fatigues rapidly with resistance ex.      Goals:   to be reached in 8 visits.  Pt. will report decreased pain from 9/10 to 5/10 at worst.  Increase pain free range of motion of cervical rotation to 70 degrees.  Pt to demonstrate fair postural awareness, avoidance of looking down at phone  Pt. will be able to tolerate housework for 30 minutes without increased symptoms.  Pt to be able to abolish pain at onset with HEP/posture  Pt. will be independent with home exercise program and self management.    Plan: cont self towel rot OP, add HP as needed to control stiffness, progress neck and trunk strength for improved postural control, tentatively 2 visits.     Skilled Services: TE, MT    Charges: TE2, MT1     Total Timed Treatment:  MT 13 min, TE 33  min, Total Treatment Time: 55 min

## 2024-09-27 ENCOUNTER — OFFICE VISIT (OUTPATIENT)
Dept: PHYSICAL THERAPY | Age: 49
End: 2024-09-27
Attending: SPECIALIST
Payer: COMMERCIAL

## 2024-09-27 PROCEDURE — 97110 THERAPEUTIC EXERCISES: CPT | Performed by: PHYSICAL THERAPIST

## 2024-09-27 PROCEDURE — 97140 MANUAL THERAPY 1/> REGIONS: CPT | Performed by: PHYSICAL THERAPIST

## 2024-09-27 NOTE — PROGRESS NOTES
DISCHARGE NOTE  Dx: Cervical pain (M54.2)         Authorized # of Visits:  8         Next MD visit: none scheduled  Fall Risk: standard         Precautions: n/a           Medication Changes since last visit?: No  Subjective: pt states pillow case stretch is hlepful, using HP at home, feeling much less tigtht.  Pain nil in neutral, 1-2/10 ERP with RR, LR, nil with ext, flexion.    Objective:  Reviewed self rot mobs with pillow case, treatment as below including hot pack to cervical spine x 10 min, supine, pt  demonstrates decreased tightness overall, min discomfort remains with RR and LR. Pt instructed to cont self rot with towel/pillow case, use HP as needed, stay active with walking and yoga.    ROM: RR: 70, LR: 70 ext 40 nil,  flex 40 w mild stretch,  RSB 30 w stretch, LSB 35 w stretch  Date: 9/5/2024  Tx#: 2/8 Date: 9/10/24  Tx#: 3/8 Date: 9/12/24  Tx#: 4/8 Date: 9/20/24  Tx#: 5/8 Date: 9/24/24  Tx#: 6/8 Date:9/27/24   Tx#: 7/8 Date:   Tx#: 8/   UBE 6 min, L5 alt UBE 6 min, L5 alt UBE 6 min, L5 alt UBE 6 min, L5 alt UBE 6 min, L5 alt UBE 6 min, L5 alt    Bicep curl/mil press 4# 2x10 Bicep curl/mil press 4# 3x10 - Bicep curl/mil press 4# 3x12 Bicep curl/mil press 4# 3x12 Bicep curl/mil press 4# 3x12    Shoulder abd and flex to 90 with 2# 2x10 ea Shoulder abd and flex to 90 with 2# 2x10 ea -  Shoulder abd and flex to 90 with 2# 2x10 ea Shoulder abd and flex to 90 with 2# 2x10 ea    Supine dumbbells3# press, circles CW/CCW and abd x 20 ea Supine dumbbells3# press, circles CW/CCW and abd x 20 ea -  Supine dumbbells3# press, circles CW/CCW and abd x 30 ea Supine dumbbells3# press, circles CW/CCW and abd x 30 ea    Supine man tx x 3 min Supine man tx x 3 min Supine man tx x 3 min Supine man tx x 3 min Supine man tx x 3 min Supine man tx x 3 min    Supine STM to cervical mm x 3 min Supine STM to cervical mm x 3 min Supine STM to cervical mm x 3 min Supine STM to cervical mm x 3 min Supine STM to cervical mm x 3 min  Supine STM to cervical mm x 3 min    Supine rot mobs, UT stretch x 2 min ea Supine rot mobs, UT stretch x 2 min ea Supine rot mobs, UT stretch x 2 min ea Supine rot mobs, UT stretch x 2 min ea Supine rot mobs, UT stretch x 2 min ea Supine rot mobs, UT stretch x 2 min ea    Prone trunk ext 2x10 Prone trunk ext 2x10 Prone trunk ext 2x10 Prone trunk ext 2x10 Prone trunk ext 2x10 Prone trunk ext 2x10    Prone man thor ext mobs gr 3 x 3 min Prone man thor ext mobs gr 3 x 3 min Prone man thor ext mobs gr 3 x 3 min Prone man thor ext mobs gr 3 x 3 min Prone man thor ext mobs gr 3 x 3 min Prone man thor ext mobs gr 3 x 3 min    Seated LR mobs gr 3 x 1 min Seated LR mobs gr 3 x 1 min Seated LR mobs gr 3 x 1 min Seated LR mobs gr 3 x 1 min Seated LR mobs gr 3 x 1 min -       Self towel rot mobs x 2 min ea reviewed       Mech tx 15 min, 30\"/20\", 19#/10# - Supine HP to neck x 10 min Supine HP to neck x 10 min        Assessment: improving ROM with decreasing symptoms, asymptomatic except mild tightness with LR post session, responds to man techniques and HP. Pt with improving tolerance to resistance ex and doing well with self management.    NDI initial 28%  NDI post: 12%    Goals:   to be reached in 8 visits.  Pt. will report decreased pain from 9/10 to 5/10 at worst.- MET  Increase pain free range of motion of cervical rotation to 70 degrees.- MET 9/27/24  Pt to demonstrate fair postural awareness, avoidance of looking down at phone- MET  Pt. will be able to tolerate housework for 30 minutes without increased symptoms.- MET  Pt to be able to abolish pain at onset with HEP/posture- mostly met  Pt. will be independent with home exercise program and self management.- MET    Plan: discharge to [facility]..     Skilled Services: TE, MT    Charges: TE2, MT1     Total Timed Treatment:  MT 12 min, TE 33  min, Total Treatment Time: 55 min

## (undated) DIAGNOSIS — M79.89 AXILLARY SWELLING: ICD-10-CM

## (undated) DIAGNOSIS — Q83.1 ACCESSORY BREAST TISSUE OF AXILLA: Primary | ICD-10-CM

## (undated) DEVICE — ELECTRODE ESURG 2.75IN EZ CLN

## (undated) DEVICE — SUT MONOCRYL 4-0 PS-2 Y496G

## (undated) DEVICE — PLASTIC BREAST CDS-LF: Brand: MEDLINE INDUSTRIES, INC.

## (undated) DEVICE — SUT VICRYL 3-0 SH J416H

## (undated) DEVICE — SOLUTION  .9 1000ML BTL

## (undated) DEVICE — MEGADYNE ELECTRODE ADULT PT RT

## (undated) DEVICE — PEN SKIN MARKING REG TIP VIOLT

## (undated) DEVICE — LIGHT HANDLE

## (undated) DEVICE — STERILE POLYISOPRENE POWDER-FREE SURGICAL GLOVES: Brand: PROTEXIS